# Patient Record
Sex: MALE | Race: WHITE | NOT HISPANIC OR LATINO | Employment: FULL TIME | URBAN - NONMETROPOLITAN AREA
[De-identification: names, ages, dates, MRNs, and addresses within clinical notes are randomized per-mention and may not be internally consistent; named-entity substitution may affect disease eponyms.]

---

## 2025-06-26 NOTE — PROGRESS NOTES
Assessment & Plan     Encounter to establish care  - No healthcare encounter for 15+ years  - No chronic conditions or daily medications  - Wanting to address a couple concerns as below and also get back on track for annual screenings  - Orders for fasting labs placed, will return in the next couple weeks for baseline labs    Routine general medical examination at a health care facility  - CBC with platelets and differential; Future  - Comprehensive metabolic panel (BMP + Alb, Alk Phos, ALT, AST, Total. Bili, TP); Future  - Hemoglobin A1c; Future  - Lipid Profile (Chol, Trig, HDL, LDL calc); Future  - HIV Antigen Antibody Combo; Future  - Hepatitis C Screen Reflex to HCV RNA Quant and Genotype; Future  - TSH with free T4 reflex; Future    Healthcare maintenance  - BP/vitals: Reviewed, normal  - BMI: Body mass index is 29.83 kg/m .; discussed healthy diet and excise recommendations  - ASCVD risk screening: Annual A1c/lipid screen.  Discussed risk mitigation including indications for ASA/statin therapy.   The ASCVD Risk score (Khalida DOSS, et al., 2019) failed to calculate for the following reasons:    The 2019 ASCVD risk score is only valid for ages 40 to 79  - Mood: Denies concerns.      6/30/2025     9:44 AM   PHQ-2 ( 1999 Pf[employer])   Q1: Little interest or pleasure in doing things 0   Q2: Feeling down, depressed or hopeless 0   PHQ-2 Score 0   - Tobacco/substance screening: No tobacco or illicit substance use     Tobacco Use      Smoking status: Never        Passive exposure: Past      Smokeless tobacco: Never      Tobacco comments: Uses nicotine pouches  - Infectious disease screening: Low risk; baseline blood-borne pathogen screen  - Cancer screening:              -Family history: PGM thyroid cancer in 50s?   -Lung: n/a   -Colon: Reviewed red flag symptoms otherwise colonoscopy screening age 45   -Prostate: Reviewed red flag symptoms otherwise screening age 50  - Immunizations: Due for Tdap    Screening for  "diabetes mellitus  - Hemoglobin A1c; Future    Screening for hyperlipidemia  - Lipid Profile (Chol, Trig, HDL, LDL calc); Future    Screening examination for infectious disease    - HIV Antigen Antibody Combo; Future  - Hepatitis C Screen Reflex to HCV RNA Quant and Genotype; Future    Subcutaneous nodule  3 cm x 4 cm x benign appearing subcutaneous nodule posterior scalp.  Present for a couple of years, interferes with wearing a hat and would like removal.  Will send to general surgery; appreciate assistance  - Adult Gen Surg  Referral    Family history of thyroid cancer  Family history of some type of thyroid cancer in paternal grandmother in her 50s.  - TSH with free T4 reflex; Future    Sleep apnea, unspecified type  History highly suspicious, loud snoring and very long witnessed apneic episodes, frequent daytime fatigue.  Has been on shift work for years but recently switched to straight days.  STOP-BANG score 5.  Will send for formal sleep evaluation.  - Adult Sleep Eval & Management  Referral; Future    Need for DTaP vaccination  - TDAP 10-64Y (ADACEL,BOOSTRIX)    BMI  Estimated body mass index is 29.83 kg/m  as calculated from the following:    Height as of this encounter: 1.938 m (6' 4.3\").    Weight as of this encounter: 112 kg (247 lb).   Weight management plan: Discussed healthy diet and exercise guidelines      The longitudinal plan of care for the diagnosis(es)/condition(s) as documented were addressed during this visit. Due to the added complexity in care, I will continue to support Armani Alba in the subsequent management and with ongoing continuity of care.    Follow-up 1 year for annual physical.    Cindy Lomeli is a 39 year old, presenting for the following health issues:  Establish Care        6/30/2025     9:42 AM   Additional Questions   Roomed by Hortencia Anthony   Accompanied by None         6/30/2025     9:42 AM   Patient Reported Additional Medications   Patient " "reports taking the following new medications None     HPI      Establish care  -No healthcare encounters for 15+ years  -No chronic conditions or daily medications  -Works at [employer]  -Would like to get on track with healthcare maintenance  -A couple specific concerns as below    Skin lesion  -Round cystic structure posterior scalp  -Present for a couple of years  -No pain, burning, itching, bleeding, drainage, paresthesias  -Located right about at hat line, frequently irritated  -Wants removal    Sleep  -Wife particularly concerned about sleep apnea  -Sounds like he has very long apneic episodes witnessed, occurs nightly  -Also very loud snoring  -Also chronic daytime fatigue  -Does not feel rested in the morning  -Has done shift work for about a decade but recently switched to straight days    Possible Sleep Apnea: 5       6/30/2025    10:10 AM   STOP-Bang Total Score   Total Score 5   Risk Stratification 5 - 8: High Risk for HERMILO       Review of Systems  Constitutional, HEENT, cardiovascular, pulmonary, gi and gu systems are negative, except as otherwise noted.      Objective    /84 (BP Location: Left arm, Patient Position: Sitting, Cuff Size: Adult Large)   Pulse 91   Temp 98.3  F (36.8  C) (Tympanic)   Resp 24   Ht 1.938 m (6' 4.3\")   Wt 112 kg (247 lb)   SpO2 98%   BMI 29.83 kg/m    Body mass index is 29.83 kg/m .  Physical Exam   GENERAL: alert and no distress  EYES: Eyes grossly normal to inspection, PERRL and conjunctivae and sclerae normal  HENT: ear canals and TM's normal, nose and mouth without ulcers or lesions  NECK: no adenopathy, no asymmetry, masses, or scars  RESP: lungs clear to auscultation - no rales, rhonchi or wheezes  CV: regular rate and rhythm, normal S1 S2, no S3 or S4, no murmur, click or rub, no peripheral edema  ABDOMEN: soft, nontender, no hepatosplenomegaly, no masses and bowel sounds normal  MS: no gross musculoskeletal defects noted, no edema  SKIN: Approximately 3 cm x 4 " cm round, semifirm, mobile, subcutaneous mass without surrounding inflammation posterior inferior scalp.  NEURO: Normal strength and tone, mentation intact and speech normal  PSYCH: mentation appears normal, affect normal/bright        Signed Electronically by: Joel Walton MD     Localized Dermabrasion Text: The patient was draped in routine manner.  Localized dermabrasion using 3 x 17 mm wire brush was performed in routine manner to papillary dermis. This spot dermabrasion is being performed to complete skin cancer reconstruction. It also will eliminate the other sun damaged precancerous cells that are known to be part of the regional effect of a lifetime's worth of sun exposure. This localized dermabrasion is therapeutic and should not be considered cosmetic in any regard. Localized Dermabrasion With Wire Brush Text: The patient was draped in routine manner.  Localized dermabrasion using 3 x 17 mm wire brush was performed in routine manner to papillary dermis. This spot dermabrasion is being performed to complete skin cancer reconstruction. It also will eliminate the other sun damaged precancerous cells that are known to be part of the regional effect of a lifetime's worth of sun exposure. This localized dermabrasion is therapeutic and should not be considered cosmetic in any regard.

## 2025-06-30 ENCOUNTER — OFFICE VISIT (OUTPATIENT)
Dept: FAMILY MEDICINE | Facility: OTHER | Age: 40
End: 2025-06-30
Attending: STUDENT IN AN ORGANIZED HEALTH CARE EDUCATION/TRAINING PROGRAM
Payer: COMMERCIAL

## 2025-06-30 VITALS
BODY MASS INDEX: 30.08 KG/M2 | TEMPERATURE: 98.3 F | WEIGHT: 247 LBS | HEART RATE: 91 BPM | OXYGEN SATURATION: 98 % | SYSTOLIC BLOOD PRESSURE: 136 MMHG | RESPIRATION RATE: 24 BRPM | DIASTOLIC BLOOD PRESSURE: 84 MMHG | HEIGHT: 76 IN

## 2025-06-30 DIAGNOSIS — Z11.9 SCREENING EXAMINATION FOR INFECTIOUS DISEASE: ICD-10-CM

## 2025-06-30 DIAGNOSIS — Z00.00 ROUTINE GENERAL MEDICAL EXAMINATION AT A HEALTH CARE FACILITY: ICD-10-CM

## 2025-06-30 DIAGNOSIS — Z13.1 SCREENING FOR DIABETES MELLITUS: ICD-10-CM

## 2025-06-30 DIAGNOSIS — Z80.8 FAMILY HISTORY OF THYROID CANCER: ICD-10-CM

## 2025-06-30 DIAGNOSIS — G47.30 SLEEP APNEA, UNSPECIFIED TYPE: ICD-10-CM

## 2025-06-30 DIAGNOSIS — Z13.220 SCREENING FOR HYPERLIPIDEMIA: ICD-10-CM

## 2025-06-30 DIAGNOSIS — R22.9 SUBCUTANEOUS NODULE: ICD-10-CM

## 2025-06-30 DIAGNOSIS — Z23 NEED FOR DTAP VACCINATION: ICD-10-CM

## 2025-06-30 DIAGNOSIS — Z00.00 HEALTHCARE MAINTENANCE: ICD-10-CM

## 2025-06-30 DIAGNOSIS — Z76.89 ENCOUNTER TO ESTABLISH CARE: Primary | ICD-10-CM

## 2025-06-30 PROCEDURE — 3075F SYST BP GE 130 - 139MM HG: CPT | Performed by: STUDENT IN AN ORGANIZED HEALTH CARE EDUCATION/TRAINING PROGRAM

## 2025-06-30 PROCEDURE — 3079F DIAST BP 80-89 MM HG: CPT | Performed by: STUDENT IN AN ORGANIZED HEALTH CARE EDUCATION/TRAINING PROGRAM

## 2025-06-30 PROCEDURE — 99214 OFFICE O/P EST MOD 30 MIN: CPT | Mod: 25 | Performed by: STUDENT IN AN ORGANIZED HEALTH CARE EDUCATION/TRAINING PROGRAM

## 2025-06-30 PROCEDURE — 90471 IMMUNIZATION ADMIN: CPT | Performed by: STUDENT IN AN ORGANIZED HEALTH CARE EDUCATION/TRAINING PROGRAM

## 2025-06-30 PROCEDURE — 90715 TDAP VACCINE 7 YRS/> IM: CPT | Performed by: STUDENT IN AN ORGANIZED HEALTH CARE EDUCATION/TRAINING PROGRAM

## 2025-06-30 PROCEDURE — G2211 COMPLEX E/M VISIT ADD ON: HCPCS | Performed by: STUDENT IN AN ORGANIZED HEALTH CARE EDUCATION/TRAINING PROGRAM

## 2025-06-30 PROCEDURE — 1126F AMNT PAIN NOTED NONE PRSNT: CPT | Performed by: STUDENT IN AN ORGANIZED HEALTH CARE EDUCATION/TRAINING PROGRAM

## 2025-06-30 PROCEDURE — 99385 PREV VISIT NEW AGE 18-39: CPT | Mod: 25 | Performed by: STUDENT IN AN ORGANIZED HEALTH CARE EDUCATION/TRAINING PROGRAM

## 2025-06-30 ASSESSMENT — PAIN SCALES - GENERAL: PAINLEVEL_OUTOF10: NO PAIN (0)

## 2025-07-08 ENCOUNTER — MYC MEDICAL ADVICE (OUTPATIENT)
Dept: PULMONOLOGY | Facility: OTHER | Age: 40
End: 2025-07-08

## 2025-07-08 ASSESSMENT — SLEEP AND FATIGUE QUESTIONNAIRES

## 2025-07-09 ENCOUNTER — PREP FOR PROCEDURE (OUTPATIENT)
Dept: SURGERY | Facility: OTHER | Age: 40
End: 2025-07-09

## 2025-07-09 ENCOUNTER — OFFICE VISIT (OUTPATIENT)
Dept: SURGERY | Facility: OTHER | Age: 40
End: 2025-07-09
Attending: STUDENT IN AN ORGANIZED HEALTH CARE EDUCATION/TRAINING PROGRAM
Payer: COMMERCIAL

## 2025-07-09 VITALS
HEART RATE: 78 BPM | DIASTOLIC BLOOD PRESSURE: 70 MMHG | SYSTOLIC BLOOD PRESSURE: 130 MMHG | OXYGEN SATURATION: 97 % | RESPIRATION RATE: 16 BRPM

## 2025-07-09 DIAGNOSIS — L72.9 SUBCUTANEOUS CYST: Primary | ICD-10-CM

## 2025-07-09 DIAGNOSIS — R22.9 SUBCUTANEOUS NODULE: Primary | ICD-10-CM

## 2025-07-09 ASSESSMENT — PAIN SCALES - GENERAL: PAINLEVEL_OUTOF10: NO PAIN (0)

## 2025-07-09 NOTE — PATIENT INSTRUCTIONS
Thank you for allowing Dr. Rapp and our surgical team to participate in your care. Please call our health unit coordinator at [phone] with scheduling questions or the nurse at [phone] with any other questions or concerns.      You have been scheduled for: Excision of posterior neck cyst with  on 7/25/25.   Please see handout for additional instruction.  You will not need a pre-operative appointment with your primary care provider.  You may call [phone] or [phone] with any questions.

## 2025-07-09 NOTE — TELEPHONE ENCOUNTER
"Chart review prior to sleep testing.    Patient Summary:  39 year old male who is referred for sleep disordered breathing.    Patient Active Problem List    Diagnosis Date Noted    Family history of thyroid cancer 06/30/2025     Priority: Medium       No current outpatient medications on file.     No current facility-administered medications for this visit.       Pertinent PMHx of snoring, witnessed apneic episodes.    STOP-BANG score of 5, with unknown neck circumference.  [place] score of 8.  SCOTT: 20    BMI of Estimated body mass index is 29.83 kg/m  as calculated from the following:    Height as of 6/30/25: 1.938 m (6' 4.3\").    Weight as of 6/30/25: 112 kg (247 lb).     Chief concern per questionnaire: \"My wife has told me that i stop breathing at night and gasp for air. She aslo notices that my whole body convulses during those episodes. And I snore loudly. I wake up tired all the time.\"    Duration of symptoms:  \"Around 10 years ago\"    Goals for visit per questionnaire: \"To find a way to help with my sleeping so I wake up feeling rested.\"    Sleep pattern:  Workdays.  10 PM - 6 AM.  Weekends.  12 AM - 11 AM.  Time to fall asleep: ~60 minutes.  Awakenings: \"I dont usually wake during the night\", 20 minutes to return to sleep.  Average total sleep time:  5 hours  Napping.  0 days per week, n/a hours per nap.    No for RLS screen.  No for sleep walking.  No for dream enactment behavior.  No for bruxism.    No for morning headaches.  Yes for snoring.  Yes for observed apnea.  Yes for FHx of HERMILO - maternal uncle.    Caffeine use:  Yes for 3+ per day.  No for within 6 hours of bed.    Tobacco use: No      A:  High likelihood of HERMILO with STOP-BANG score of 5.    Strong suspicion for sleep-state misperception with low reported total sleep time of 5 hours. Spending 8-11 hours in bed.    P:   Recommend HST, WatchPat. Would appear to be candidate for either home sleep testing or diagnostic in-lab PSG. If HST is not " covered by insurance and in-lab tested required, then recommend in-lab diagnostic PSG.     Recommend to also start with actigraphy with sleep diaries.    Wali Valenzuela PA-C     Sleep Medicine  [facility] [place] [place]  [address] [place] [address], [id number]  Schedule visits: [phone]  Main Office: [phone]  Fax: [phone]

## 2025-07-09 NOTE — PROGRESS NOTES
[facility] General Surgery Consultation    CHIEF COMPLAINT:  Chief Complaint   Patient presents with    Consult     Referred by Dr. Walton for subcutaneous scalp nodule       HISTORY OF PRESENT ILLNESS:  Armani Alba is a 39 year old male who is seen in consultation at the request of Dr. Walton for evaluation of a posterior scalp/neck nodule.  He reports this has been present for approximately 2 years.  It has been quite stable in size.  He has never had any pain, drainage, or other issues.  He has tried to jhon it previously but did not have any discharge at that time.  No personal or family history of skin cancers.  No personal history of any similar lesions in the past.    REVIEW OF SYSTEMS:  10 point ROS completed and negative unless otherwise listed in HPI    History reviewed. No pertinent past medical history.    History reviewed. No pertinent surgical history.    Family History   Problem Relation Age of Onset    Thyroid Cancer Paternal Grandmother        Social History     Tobacco Use    Smoking status: Never     Passive exposure: Past    Smokeless tobacco: Never    Tobacco comments:     Uses nicotine pouches   Substance Use Topics    Alcohol use: Not on file       Patient Active Problem List   Diagnosis    Family history of thyroid cancer       No Known Allergies    No current outpatient medications on file.       Vitals: /70 (BP Location: Right arm, Cuff Size: Adult Large)   Pulse 78   Resp 16   SpO2 97%   BMI= There is no height or weight on file to calculate BMI.    EXAM:  General: Vital signs reviewed, in no apparent distress  Eyes: Anicteric  HENT: Normocephalic, atraumatic, trachea midline   Respiratory: Breathing nonlabored  Musculoskeletal: No gross deformities  Neurologic: Grossly nonfocal exam  Psychiatric: Normal mood, affect and insight  Integumentary: Posterior subcutaneous mass palpable at the left occiput, mobile, measuring approximately 3 cm in diameter and extending  deep likely to the border of the underlying neck muscle.  No overlying skin changes.    ASSESSMENT:  Armani Alba is a 39 year old who presents with a 3 cm subcutaneous mass on the posterior neck/occiput.  I discussed given its size and likely proximity to the underlying muscle, but not recommend excising this here today in clinic.  I would recommend a surgical excision in the operating room with more tools for hemostasis.  Patient is agreeable to this.  I discussed obtaining imaging for operative planning prior to surgery.      PLAN:  Ordered ultrasound of posterior neck mass  Plan for operative excision of posterior neck mass in the near future    It was my pleasure to participate in the care of Armani Alba in clinic today. Thank you for this consultation.         Trevor Rapp MD, MS  General Surgeon  [facility]

## 2025-07-09 NOTE — PROGRESS NOTES
07/08/25 2208   Reason For Your Visit   Please briefly describe the main reason(s) for your sleep visit (Proxy-Rptd)  My wife has told me that i stop breathing at night and gasp for air. She aslo notices that my whole body convulses during those episodes. And I snore loudly. I wake up tired all the time.   Approximately when did this problem start (Proxy-Rptd)  Around 10 years ago   What are your goals for this visit (Proxy-Rptd)  To find a way to help with my sleeping so I wake up feeling rested.   Time in Bed - Work Or School Days   Do you work or go to school (Proxy-Rptd)  Yes   What time do you usually get into bed (Proxy-Rptd)  10 pm   About how long does it take you to fall asleep (Proxy-Rptd)  Usually about an hour.   How often do you have trouble falling asleep (Proxy-Rptd)  3 times a week.   How often do you wake up during the night (Proxy-Rptd)  I dont usually wake during the night   Do you work days/evenings/nights/rotating shifts (Proxy-Rptd)  Days;Nights;Rotating Shifts   What wakes you up at night (Proxy-Rptd)  Snorting self awake   How often do you have trouble falling back to sleep (Proxy-Rptd)  2 times a week   About how long does it take to fall back to sleep (Proxy-Rptd)  20 minutes   What do you usually do if you have trouble getting back to sleep (Proxy-Rptd)  Go on my phone   What time do you usually get out of bed to start your day (Proxy-Rptd)  6 am   Do you use an alarm (Proxy-Rptd)  Yes   Time in Bed - Weekends/Non-work Days/All Other Days   What time do you usually get into bed (Proxy-Rptd)  12 am   About how long does it take you to fall asleep (Proxy-Rptd)  30 min   What time do you usually get out of bed to start your day (Proxy-Rptd)  11am   Do you use an alarm (Proxy-Rptd)  No   Sleep Need   On average, about how much sleep do you think you get (Proxy-Rptd)  5 hours   About how much sleep do you think you need (Proxy-Rptd)  8 hours   Sleep Position   Which sleep positions do you  prefer (Proxy-Rptd)  Side   Do you do any of the following activities in bed (Proxy-Rptd)  Eat;Watch TV;Use phone, computer, or tablet   How often do you take a nap on purpose (Proxy-Rptd)  2 times per week   About how long are your naps (Proxy-Rptd)  20 minutes   Do you feel better after naps (Proxy-Rptd)  No   How often do you doze off unintentionally (Proxy-Rptd)  Rarely   Have you ever had a driving accident or near-miss due to sleepiness/drowsiness (Proxy-Rptd)  No   Sleep Disruptions - Breathing/Snoring   Do you snore (Proxy-Rptd)  Yes   Do other people complain about your snoring (Proxy-Rptd)  Yes   Have you been told you stop breathing in your sleep (Proxy-Rptd)  Yes   Do you have issues with any of the following (Proxy-Rptd)  Morning mouth dryness;Stuffy nose when you wake up   Sleep Disruptions - Movement   Do you get pain, discomfort, with an urge to move (Proxy-Rptd)  No   Does it happen when you are resting (Proxy-Rptd)  No   Does it get better if you move around (Proxy-Rptd)  No   Does it happen more at night (Proxy-Rptd)  No   Have you been told you kick your legs at night (Proxy-Rptd)  Yes   Sleep Disruptions - Behaviours in Sleep   Have you ever experienced any of the following during your sleep (Proxy-Rptd)  Kicking or punching   Do you ever experience sudden muscle weakness during the day (Proxy-Rptd)  No   Caffeine, Alcohol and Other Substances   How many caffeinated beverages (coffee, tea, soda, energy drinks) per day (Proxy-Rptd)  3 cups of coffee per day and maybe 1 or 2 energy drinks per week   What time of day is your last caffeine use (Proxy-Rptd)  11:00 am   Do you drink alcohol to help you sleep (Proxy-Rptd)  No   Do you drink alcohol near bedtime (Proxy-Rptd)  Yes   Family History   Has any family member been diagnosed with a sleep disorder (Proxy-Rptd)  Yes   If yes, please indicate (Proxy-Rptd)  Uncles on mothers side of family   In the last TWO WEEKS have you experienced any of the  following symptoms?   Fevers (Proxy-Rptd)  No   Night Sweats (Proxy-Rptd)  No   Weight Gain (Proxy-Rptd)  No   Pain at Night (Proxy-Rptd)  No   Double Vision (Proxy-Rptd)  No   Changes in Vision (Proxy-Rptd)  No   Difficulty Breathing through Nose (Proxy-Rptd)  Yes   Sore Throat in Morning (Proxy-Rptd)  Yes   Dry Mouth in the Morning (Proxy-Rptd)  Yes   Shortness of Breath Lying Flat (Proxy-Rptd)  No   Shortness of Breath With Activity (Proxy-Rptd)  No   Awakening with Shortness of Breath (Proxy-Rptd)  No   Increased Cough (Proxy-Rptd)  No   Heart Racing at Night (Proxy-Rptd)  Yes   Swelling in Feet or Legs (Proxy-Rptd)  Yes   Diarrhea at Night (Proxy-Rptd)  No   Heartburn at Night (Proxy-Rptd)  No   Urinating More than Once at Night (Proxy-Rptd)  No   Losing Control of Urine at Night (Proxy-Rptd)  No   Joint Pains at Night (Proxy-Rptd)  No   Headaches in Morning (Proxy-Rptd)  No   Weakness in Arms or Legs (Proxy-Rptd)  No   Depressed Mood (Proxy-Rptd)  Yes   Anxiety (Proxy-Rptd)  Yes         7/8/2025    10:14 PM    [place] Sleepiness Scale ( MARLO Moya  [id number]<br>ESS - [place]/English - Final version - 21 Nov 07 - [facility] [place].)   Sitting and reading Moderate chance of dozing    Watching TV Moderate chance of dozing    Sitting, inactive in a public place (e.g. a theatre or a meeting) Would never doze    As a passenger in a car for an hour without a break Slight chance of dozing    Lying down to rest in the afternoon when circumstances permit Slight chance of dozing    Sitting and talking to someone Would never doze    Sitting quietly after a lunch without alcohol Moderate chance of dozing    In a car, while stopped for a few minutes in traffic Would never doze    [place] Score (MC) 8    [place] Score (Sleep) 8        Proxy-reported         7/8/2025    10:11 PM   Insomnia Severity Index (SCOTT)   Difficulty falling asleep 2    Difficulty staying asleep 1    Problems waking up too early 4    How  SATISFIED/DISSATISFIED are you with your CURRENT sleep pattern? 3    How NOTICEABLE to others do you think your sleep problem is in terms of impairing the quality of your life? 4    How WORRIED/DISTRESSED are you about your current sleep problem? 3    To what extent do you consider your sleep problem to INTERFERE with your daily functioning (e.g. daytime fatigue, mood, ability to function at work/daily chores, concentration, memory, mood, etc.) CURRENTLY? 3    SCOTT Total Score 20        Proxy-reported         7/8/2025    10:12 PM   STOP BANG Questionnaire (  2008, the American Society of Anesthesiologists, Inc[employer]   1. Snoring - Do you snore loudly (louder than talking or loud enough to be heard through closed doors)? Yes    2. Tired - Do you often feel tired, fatigued, or sleepy during daytime? Yes    3. Observed - Has anyone observed you stop breathing during your sleep? Yes    4. Blood pressure - Do you have or are you being treated for high blood pressure? No    5. BMI - BMI more than 35 kg/m2? No    6. Age - Age over 50 yr old? No    7. Neck circumference - Neck circumference greater than 40 cm? Yes    8. Gender - Gender male? Yes    STOP BANG Score (MC): 5 (High risk of HERMILO)        Proxy-reported

## 2025-07-11 ENCOUNTER — HOSPITAL ENCOUNTER (OUTPATIENT)
Dept: ULTRASOUND IMAGING | Facility: HOSPITAL | Age: 40
Discharge: HOME | End: 2025-07-11
Attending: STUDENT IN AN ORGANIZED HEALTH CARE EDUCATION/TRAINING PROGRAM | Admitting: RADIOLOGY
Payer: COMMERCIAL

## 2025-07-11 DIAGNOSIS — R22.9 SUBCUTANEOUS NODULE: ICD-10-CM

## 2025-07-11 PROCEDURE — 76536 US EXAM OF HEAD AND NECK: CPT

## 2025-07-11 PROCEDURE — 76536 US EXAM OF HEAD AND NECK: CPT | Mod: 26 | Performed by: RADIOLOGY

## 2025-07-21 ENCOUNTER — TELEPHONE (OUTPATIENT)
Dept: SURGERY | Facility: OTHER | Age: 40
End: 2025-07-21

## 2025-07-21 NOTE — TELEPHONE ENCOUNTER
S/P 7/25 Excision of posterior neck cyst   Pt LVM asking if he needs to take time off of work  Writer returned call & LVM with direct # to call back- will discuss as able.

## 2025-07-22 ENCOUNTER — OFFICE VISIT (OUTPATIENT)
Dept: SLEEP MEDICINE | Facility: HOSPITAL | Age: 40
End: 2025-07-22
Attending: FAMILY MEDICINE
Payer: COMMERCIAL

## 2025-07-22 DIAGNOSIS — G47.00 INSOMNIA WITH SLEEP APNEA: Primary | ICD-10-CM

## 2025-07-22 DIAGNOSIS — G47.30 INSOMNIA WITH SLEEP APNEA: Primary | ICD-10-CM

## 2025-07-22 NOTE — PROGRESS NOTES
Actigraphy Sleep Testing Pick-Up Visit Note and Instructions    Actigraphy : 2025  Actigraphy Drop off: 2025  Additional Testin2025- Watch pat mail out  Provider Follow Up: 2025     and Drop off Location:  [address] [address] -Please use the West entrance of the hospital and enter door 18 -Go to the Rehab Services  and check-in for your sleep medicine equipment pick-up/drop-off appointment    Patient is completing an actigraphy test. Patient was educated appropriate and instructed on how to put on the actigraphy device and associated equipment and was given the opportunity to practice putting it on before leaving the sleep center. Patient was reminded to bring the actigraphy unit back at the scheduled time for download and reporting. All patient questions were answered and contact information provided for additional questions/concerns.    Device number:      If you have any questions or concerns, please call the [facility] and [facility] at [phone]. Thank you!

## 2025-07-24 NOTE — PROGRESS NOTES
Pt stated battering is going dead. 2nd time this has happened in 2 days on 2 different watches. Pt picking up a 3rd unit today to complete actigraphy testing. Unit [id number]

## 2025-07-25 ENCOUNTER — HOSPITAL ENCOUNTER (OUTPATIENT)
Facility: HOSPITAL | Age: 40
Discharge: HOME | End: 2025-07-25
Attending: STUDENT IN AN ORGANIZED HEALTH CARE EDUCATION/TRAINING PROGRAM | Admitting: STUDENT IN AN ORGANIZED HEALTH CARE EDUCATION/TRAINING PROGRAM
Payer: COMMERCIAL

## 2025-07-25 VITALS
RESPIRATION RATE: 16 BRPM | SYSTOLIC BLOOD PRESSURE: 132 MMHG | OXYGEN SATURATION: 99 % | WEIGHT: 237 LBS | HEART RATE: 72 BPM | DIASTOLIC BLOOD PRESSURE: 77 MMHG | TEMPERATURE: 97.4 F | BODY MASS INDEX: 29.47 KG/M2 | HEIGHT: 75 IN

## 2025-07-25 PROCEDURE — 272N000001 HC OR GENERAL SUPPLY STERILE: Performed by: STUDENT IN AN ORGANIZED HEALTH CARE EDUCATION/TRAINING PROGRAM

## 2025-07-25 PROCEDURE — 710N000010 HC RECOVERY PHASE 1, LEVEL 2, PER MIN: Performed by: STUDENT IN AN ORGANIZED HEALTH CARE EDUCATION/TRAINING PROGRAM

## 2025-07-25 PROCEDURE — 11423 EXC H-F-NK-SP B9+MARG 2.1-3: CPT | Performed by: STUDENT IN AN ORGANIZED HEALTH CARE EDUCATION/TRAINING PROGRAM

## 2025-07-25 PROCEDURE — 258N000003 HC RX IP 258 OP 636: Performed by: NURSE PRACTITIONER

## 2025-07-25 PROCEDURE — 360N000075 HC SURGERY LEVEL 2, PER MIN: Performed by: STUDENT IN AN ORGANIZED HEALTH CARE EDUCATION/TRAINING PROGRAM

## 2025-07-25 PROCEDURE — 250N000011 HC RX IP 250 OP 636: Performed by: STUDENT IN AN ORGANIZED HEALTH CARE EDUCATION/TRAINING PROGRAM

## 2025-07-25 PROCEDURE — 710N000012 HC RECOVERY PHASE 2, PER MINUTE: Performed by: STUDENT IN AN ORGANIZED HEALTH CARE EDUCATION/TRAINING PROGRAM

## 2025-07-25 PROCEDURE — 999N000141 HC STATISTIC PRE-PROCEDURE NURSING ASSESSMENT: Performed by: STUDENT IN AN ORGANIZED HEALTH CARE EDUCATION/TRAINING PROGRAM

## 2025-07-25 PROCEDURE — 88304 TISSUE EXAM BY PATHOLOGIST: CPT | Mod: 26 | Performed by: PATHOLOGY

## 2025-07-25 PROCEDURE — 12042 INTMD RPR N-HF/GENIT2.6-7.5: CPT | Performed by: STUDENT IN AN ORGANIZED HEALTH CARE EDUCATION/TRAINING PROGRAM

## 2025-07-25 PROCEDURE — 370N000017 HC ANESTHESIA TECHNICAL FEE, PER MIN: Performed by: STUDENT IN AN ORGANIZED HEALTH CARE EDUCATION/TRAINING PROGRAM

## 2025-07-25 PROCEDURE — 88304 TISSUE EXAM BY PATHOLOGIST: CPT | Mod: TC | Performed by: STUDENT IN AN ORGANIZED HEALTH CARE EDUCATION/TRAINING PROGRAM

## 2025-07-25 PROCEDURE — 250N000025 HC SEVOFLURANE, PER MIN: Performed by: STUDENT IN AN ORGANIZED HEALTH CARE EDUCATION/TRAINING PROGRAM

## 2025-07-25 RX ORDER — ONDANSETRON 4 MG/1
4 TABLET, ORALLY DISINTEGRATING ORAL EVERY 30 MIN PRN
Status: DISCONTINUED | OUTPATIENT
Start: 2025-07-25 | End: 2025-07-25 | Stop reason: HOSPADM

## 2025-07-25 RX ORDER — CEFAZOLIN SODIUM/WATER 2 G/20 ML
2 SYRINGE (ML) INTRAVENOUS
Status: COMPLETED | OUTPATIENT
Start: 2025-07-25 | End: 2025-07-25

## 2025-07-25 RX ORDER — SODIUM CHLORIDE, SODIUM LACTATE, POTASSIUM CHLORIDE, CALCIUM CHLORIDE 600; 310; 30; 20 MG/100ML; MG/100ML; MG/100ML; MG/100ML
INJECTION, SOLUTION INTRAVENOUS CONTINUOUS
Status: DISCONTINUED | OUTPATIENT
Start: 2025-07-25 | End: 2025-07-25 | Stop reason: HOSPADM

## 2025-07-25 RX ORDER — BUPIVACAINE HYDROCHLORIDE AND EPINEPHRINE 2.5; 5 MG/ML; UG/ML
INJECTION, SOLUTION EPIDURAL; INFILTRATION; INTRACAUDAL; PERINEURAL
Status: DISCONTINUED
Start: 2025-07-25 | End: 2025-07-25 | Stop reason: WASHOUT

## 2025-07-25 RX ORDER — DIAZEPAM 10 MG/2ML
2.5 INJECTION, SOLUTION INTRAMUSCULAR; INTRAVENOUS
Status: DISCONTINUED | OUTPATIENT
Start: 2025-07-25 | End: 2025-07-25 | Stop reason: HOSPADM

## 2025-07-25 RX ORDER — HYDROMORPHONE HYDROCHLORIDE 1 MG/ML
0.5 INJECTION, SOLUTION INTRAMUSCULAR; INTRAVENOUS; SUBCUTANEOUS EVERY 5 MIN PRN
Status: DISCONTINUED | OUTPATIENT
Start: 2025-07-25 | End: 2025-07-25 | Stop reason: HOSPADM

## 2025-07-25 RX ORDER — HYDROMORPHONE HYDROCHLORIDE 1 MG/ML
0.25 INJECTION, SOLUTION INTRAMUSCULAR; INTRAVENOUS; SUBCUTANEOUS EVERY 5 MIN PRN
Status: DISCONTINUED | OUTPATIENT
Start: 2025-07-25 | End: 2025-07-25 | Stop reason: HOSPADM

## 2025-07-25 RX ORDER — ONDANSETRON 2 MG/ML
4 INJECTION INTRAMUSCULAR; INTRAVENOUS EVERY 30 MIN PRN
Status: DISCONTINUED | OUTPATIENT
Start: 2025-07-25 | End: 2025-07-25 | Stop reason: HOSPADM

## 2025-07-25 RX ORDER — CEFAZOLIN SODIUM/WATER 2 G/20 ML
2 SYRINGE (ML) INTRAVENOUS SEE ADMIN INSTRUCTIONS
Status: DISCONTINUED | OUTPATIENT
Start: 2025-07-25 | End: 2025-07-25 | Stop reason: HOSPADM

## 2025-07-25 RX ORDER — FENTANYL CITRATE 50 UG/ML
50 INJECTION, SOLUTION INTRAMUSCULAR; INTRAVENOUS EVERY 5 MIN PRN
Status: DISCONTINUED | OUTPATIENT
Start: 2025-07-25 | End: 2025-07-25 | Stop reason: HOSPADM

## 2025-07-25 RX ORDER — HYDRALAZINE HYDROCHLORIDE 20 MG/ML
2.5-5 INJECTION INTRAMUSCULAR; INTRAVENOUS EVERY 10 MIN PRN
Status: DISCONTINUED | OUTPATIENT
Start: 2025-07-25 | End: 2025-07-25 | Stop reason: HOSPADM

## 2025-07-25 RX ORDER — NALOXONE HYDROCHLORIDE 0.4 MG/ML
0.1 INJECTION, SOLUTION INTRAMUSCULAR; INTRAVENOUS; SUBCUTANEOUS
Status: DISCONTINUED | OUTPATIENT
Start: 2025-07-25 | End: 2025-07-25 | Stop reason: HOSPADM

## 2025-07-25 RX ORDER — ALBUTEROL SULFATE 0.83 MG/ML
2.5 SOLUTION RESPIRATORY (INHALATION) EVERY 4 HOURS PRN
Status: DISCONTINUED | OUTPATIENT
Start: 2025-07-25 | End: 2025-07-25 | Stop reason: HOSPADM

## 2025-07-25 RX ORDER — FENTANYL CITRATE 50 UG/ML
25 INJECTION, SOLUTION INTRAMUSCULAR; INTRAVENOUS EVERY 5 MIN PRN
Status: DISCONTINUED | OUTPATIENT
Start: 2025-07-25 | End: 2025-07-25 | Stop reason: HOSPADM

## 2025-07-25 RX ORDER — LIDOCAINE 40 MG/G
CREAM TOPICAL
Status: DISCONTINUED | OUTPATIENT
Start: 2025-07-25 | End: 2025-07-25 | Stop reason: HOSPADM

## 2025-07-25 RX ORDER — DEXAMETHASONE SODIUM PHOSPHATE 4 MG/ML
4 INJECTION, SOLUTION INTRA-ARTICULAR; INTRALESIONAL; INTRAMUSCULAR; INTRAVENOUS; SOFT TISSUE
Status: DISCONTINUED | OUTPATIENT
Start: 2025-07-25 | End: 2025-07-25 | Stop reason: HOSPADM

## 2025-07-25 RX ORDER — BUPIVACAINE HYDROCHLORIDE 5 MG/ML
INJECTION, SOLUTION PERINEURAL PRN
Status: DISCONTINUED | OUTPATIENT
Start: 2025-07-25 | End: 2025-07-25 | Stop reason: HOSPADM

## 2025-07-25 RX ORDER — FENTANYL CITRATE 50 UG/ML
50 INJECTION, SOLUTION INTRAMUSCULAR; INTRAVENOUS
Status: DISCONTINUED | OUTPATIENT
Start: 2025-07-25 | End: 2025-07-25 | Stop reason: HOSPADM

## 2025-07-25 RX ORDER — BUPIVACAINE HYDROCHLORIDE 5 MG/ML
INJECTION, SOLUTION EPIDURAL; INTRACAUDAL; PERINEURAL
Status: DISCONTINUED
Start: 2025-07-25 | End: 2025-07-25 | Stop reason: HOSPADM

## 2025-07-25 RX ORDER — LIDOCAINE HYDROCHLORIDE 10 MG/ML
INJECTION, SOLUTION EPIDURAL; INFILTRATION; INTRACAUDAL; PERINEURAL
Status: DISCONTINUED
Start: 2025-07-25 | End: 2025-07-25 | Stop reason: WASHOUT

## 2025-07-25 RX ORDER — HALOPERIDOL 5 MG/ML
2 INJECTION INTRAMUSCULAR
Status: DISCONTINUED | OUTPATIENT
Start: 2025-07-25 | End: 2025-07-25 | Stop reason: HOSPADM

## 2025-07-25 RX ADMIN — SODIUM CHLORIDE, POTASSIUM CHLORIDE, SODIUM LACTATE AND CALCIUM CHLORIDE: 600; 310; 30; 20 INJECTION, SOLUTION INTRAVENOUS at 08:40

## 2025-07-25 RX ADMIN — Medication 2 G: at 08:35

## 2025-07-25 ASSESSMENT — ACTIVITIES OF DAILY LIVING (ADL)
ADLS_ACUITY_SCORE: 15
ADLS_ACUITY_SCORE: 19
ADLS_ACUITY_SCORE: 15
ADLS_ACUITY_SCORE: 15

## 2025-07-25 NOTE — OR NURSING
Patient and responsible adult given discharge instructions with no questions regarding instructions. Branden score 19. Pain level 2/10. Discharged from unit via ambulation. Patient discharged to home with spouse.

## 2025-07-25 NOTE — DISCHARGE INSTRUCTIONS
Thank you for allowing [facility] to care for you today.  If you have any questions and/or concerns please reach out to us Monday-Friday 0800-4:00 PM at [phone].  After hours please go to the Urgent Care and/or Emergency Room.  If you feel like it is an emergency call 911.

## 2025-07-25 NOTE — LETTER
July 25, 2025      Armani Alba  [address]  [place] [address]        To Whom It May Concern:    Armani Alba was seen in our surgical department. He may return to work in one week with no restrictions.       Sincerely,      Trevor Rapp MD      Electronically signed

## 2025-07-25 NOTE — OP NOTE
General Surgery Operative Note    PREOPERATIVE DIAGNOSIS: Posterior neck mass    POSTOPERATIVE DIAGNOSIS: Posterior neck mass    PROCEDURE:   Procedure(s):  Excision of posterior neck cyst measuring 3 cm x 3 cm.    ANESTHESIA:  General    SURGEON:  Trevor Rapp MD    ASSISTANT: YENI Garcia. Assistant was required owing to challenging exposure and need for retraction.     INDICATIONS: Patient is a 39-year-old male with a longstanding history of a mass on his posterior neck.  He underwent an ultrasound which demonstrated a 2.5 cm diameter complex mass not consistent with a simple cyst.  After discussion of the risks and benefits including without limited bleeding, infection, recurrence, and wound complications, the patient agreed to proceed with the above procedure.    PROCEDURE:  The patient was taken to the operating suite and uneventfully endotracheally intubated.  He was placed in prone position.  The posterior neck was prepped and draped in a sterile fashion.  Surgeon initiated timeout was acknowledged.      0.5% Marcaine was injected into the skin overlying the mass.  A #15 scalpel was used to make an incision overlying the mass transversely.  The mass was readily apparent and was mobilized with electrocautery and blunt dissection.  There was a small violation of the capsule of the mass which revealed caseous contents consistent with a possible epidermal inclusion cyst.  The entirety of the mass and capsule were removed.  Electrocautery was used to obtain hemostasis which was excellent.  The wound was thoroughly irrigated.  The cavity was approximated with a figure-of-eight 3-0 Vicryl suture.  The skin was closed with multiple interrupted 3-0 Vicryl deep dermal sutures and a running subcuticular 4-0 Monocryl suture.  Dermabond was applied.        The patient was uneventfully extubated, awakened and taken to the PACU in stable condition.  At the conclusion of the case, all lap and needle counts were  correct.      ESTIMATED BLOOD LOSS:  5cc    INTRAOPERATIVE FINDINGS: posterior neck mass consistent with possible epidermal inclusion cyst.    Trevor Rapp MD

## 2025-07-25 NOTE — H&P
"[facility] Pre-Op H&P    CHIEF COMPLAINT:  No chief complaint on file.      HISTORY OF PRESENT ILLNESS:  Armani Alba is a 39 year old male who is today for excision of a lesion on the back of his neck. Suspicion for HERMILO based on last PCP visit. No other pertinent medical hx.     REVIEW OF SYSTEMS:  10 point review of symptoms completed and negative unless otherwise listed in HPI    History reviewed. No pertinent past medical history.    History reviewed. No pertinent surgical history.    Family History   Problem Relation Age of Onset    Thyroid Cancer Paternal Grandmother        Social History     Tobacco Use    Smoking status: Never     Passive exposure: Past    Smokeless tobacco: Never    Tobacco comments:     Uses nicotine pouches   Substance Use Topics    Alcohol use: Yes     Comment: weekends       Patient Active Problem List   Diagnosis    Family history of thyroid cancer       No Known Allergies    Prior to Admission medications   Not on File       Vitals: /89   Pulse 78   Temp 97.2  F (36.2  C) (Tympanic)   Resp 16   Ht 1.905 m (6' 3\")   Wt 107.5 kg (237 lb)   SpO2 98%   BMI 29.62 kg/m    BMI= Body mass index is 29.62 kg/m .    EXAM:  General: Vital signs reviewed, in no apparent distress  Eyes: Anicteric  HENT: Normocephalic, atraumatic, trachea midline   Respiratory: Breathing nonlabored, no wheezing, rhonchi or rales on bilateral ausculation  Cardiovascular: Regular rate and rhythm, no murmurs, rubs or gallops  GI: Abdomen soft, nondistended, nontender, no organomegaly  Musculoskeletal: No gross deformities  Neurologic: Grossly nonfocal exam  Psychiatric: Normal mood, affect and insight  Integumentary: Posterior neck mass.       ASSESSMENT:  Armani Alba is a 39 year old who presents for excision of a lesion on the back of his neck. Possible HERMILO which he has not had a sleep study for yet. ASA Class II.       PLAN:  Proceed with excision of neck lesion.            Trevor SWENSON" MD Tamela

## 2025-07-25 NOTE — Clinical Note
[facility]  [address]  [place] [address]  Phone: [phone]    July 25, 2025        Armani Alba  [address] [facility] [place] [address]          To whom it may concern:    RE: Armani Alba    {:[id number]}    Please contact me for questions or concerns.      Sincerely,      Trevor Rapp MD

## 2025-07-28 LAB
PATH REPORT.COMMENTS IMP SPEC: NORMAL
PATH REPORT.FINAL DX SPEC: NORMAL
PATH REPORT.GROSS SPEC: NORMAL
PATH REPORT.MICROSCOPIC SPEC OTHER STN: NORMAL
PATH REPORT.RELEVANT HX SPEC: NORMAL
PHOTO IMAGE: NORMAL

## 2025-07-29 ENCOUNTER — MYC MEDICAL ADVICE (OUTPATIENT)
Dept: SURGERY | Facility: OTHER | Age: 40
End: 2025-07-29

## 2025-07-29 NOTE — PROGRESS NOTES
Patient was provided both verbal and written education and instructions on use of Watch PAT device. Watch PAT device has been registered and shipped via [employer] on 7/29/2025. Patient was notified that package was mailed out.   Watch [employer] serial number: [id number].  Tracking number: [id number]

## 2025-07-30 ENCOUNTER — VIRTUAL VISIT (OUTPATIENT)
Dept: SLEEP MEDICINE | Facility: HOSPITAL | Age: 40
End: 2025-07-30
Attending: FAMILY MEDICINE
Payer: COMMERCIAL

## 2025-07-30 DIAGNOSIS — G47.00 INSOMNIA WITH SLEEP APNEA: Primary | ICD-10-CM

## 2025-07-30 DIAGNOSIS — G47.30 INSOMNIA WITH SLEEP APNEA: Primary | ICD-10-CM

## 2025-08-05 ENCOUNTER — DOCUMENTATION ONLY (OUTPATIENT)
Dept: SLEEP MEDICINE | Facility: HOSPITAL | Age: 40
End: 2025-08-05
Attending: FAMILY MEDICINE
Payer: COMMERCIAL

## 2025-08-05 DIAGNOSIS — G47.00 INSOMNIA WITH SLEEP APNEA: Primary | ICD-10-CM

## 2025-08-05 DIAGNOSIS — G47.30 INSOMNIA WITH SLEEP APNEA: Primary | ICD-10-CM

## 2025-08-05 PROCEDURE — 95803 ACTIGRAPHY TESTING: CPT

## 2025-08-07 ENCOUNTER — OFFICE VISIT (OUTPATIENT)
Dept: SURGERY | Facility: OTHER | Age: 40
End: 2025-08-07
Attending: STUDENT IN AN ORGANIZED HEALTH CARE EDUCATION/TRAINING PROGRAM
Payer: COMMERCIAL

## 2025-08-07 VITALS
DIASTOLIC BLOOD PRESSURE: 80 MMHG | RESPIRATION RATE: 16 BRPM | OXYGEN SATURATION: 98 % | HEART RATE: 78 BPM | SYSTOLIC BLOOD PRESSURE: 130 MMHG | TEMPERATURE: 97.9 F

## 2025-08-07 DIAGNOSIS — L72.9 SUBCUTANEOUS CYST: Primary | ICD-10-CM

## 2025-08-07 ASSESSMENT — PAIN SCALES - GENERAL: PAINLEVEL_OUTOF10: NO PAIN (0)

## 2025-09-03 ENCOUNTER — VIRTUAL VISIT (OUTPATIENT)
Dept: SLEEP MEDICINE | Facility: HOSPITAL | Age: 40
End: 2025-09-03
Attending: FAMILY MEDICINE
Payer: COMMERCIAL

## 2025-09-03 DIAGNOSIS — G47.00 INSOMNIA WITH SLEEP APNEA: Primary | ICD-10-CM

## 2025-09-03 DIAGNOSIS — G47.30 INSOMNIA WITH SLEEP APNEA: Primary | ICD-10-CM

## (undated) DEVICE — PACK BASIN SET UP SUTCNBSBBA

## (undated) DEVICE — COVER LT HANDLE 2/PK 15160-2FG

## (undated) DEVICE — SOL WATER IRRIG 1000ML BOTTLE 2F7114

## (undated) DEVICE — PREP CHLORAPREP 26ML TINTED HI-LITE ORANGE 930815

## (undated) DEVICE — SLEEVE SCD EXPRESS KNEE LENGTH MED 9529

## (undated) DEVICE — TUBING SUCTION 20FT N620A

## (undated) DEVICE — ESU GROUND PAD ADULT W/CORD E7507

## (undated) DEVICE — PACK LAPAROTOMY CUSTOM SBA32LPMBG

## (undated) DEVICE — CANISTER SUCTION MEDI-VAC GUARDIAN 2000ML 90D 65651-220

## (undated) DEVICE — LABEL STERILE PREPRINTED FOR OR FRRH01-2M

## (undated) DEVICE — SU MONOCRYL 4-0 PS-2 18" UND Y496G

## (undated) DEVICE — SOL NACL 0.9% IRRIG 1000ML BOTTLE 2F7124

## (undated) RX ORDER — ONDANSETRON 2 MG/ML
INJECTION INTRAMUSCULAR; INTRAVENOUS
Status: DISPENSED
Start: 2025-07-25

## (undated) RX ORDER — ACETAMINOPHEN 10 MG/ML
INJECTION, SOLUTION INTRAVENOUS
Status: DISPENSED
Start: 2025-07-25

## (undated) RX ORDER — DEXAMETHASONE SODIUM PHOSPHATE 10 MG/ML
INJECTION, SOLUTION INTRAMUSCULAR; INTRAVENOUS
Status: DISPENSED
Start: 2025-07-25

## (undated) RX ORDER — FENTANYL CITRATE 50 UG/ML
INJECTION, SOLUTION INTRAMUSCULAR; INTRAVENOUS
Status: DISPENSED
Start: 2025-07-25

## (undated) RX ORDER — PROPOFOL 10 MG/ML
INJECTION, EMULSION INTRAVENOUS
Status: DISPENSED
Start: 2025-07-25

## (undated) RX ORDER — DEXMEDETOMIDINE HYDROCHLORIDE 4 UG/ML
INJECTION, SOLUTION INTRAVENOUS
Status: DISPENSED
Start: 2025-07-25